# Patient Record
Sex: FEMALE | Race: WHITE | URBAN - METROPOLITAN AREA
[De-identification: names, ages, dates, MRNs, and addresses within clinical notes are randomized per-mention and may not be internally consistent; named-entity substitution may affect disease eponyms.]

---

## 2017-01-17 ENCOUNTER — HOSPITAL ENCOUNTER (OUTPATIENT)
Dept: LAB | Age: 27
Discharge: HOME | End: 2017-01-17
Payer: SELF-PAY

## 2017-01-17 ENCOUNTER — OFFICE VISIT (OUTPATIENT)
Dept: INTERNAL MEDICINE CLINIC | Age: 27
End: 2017-01-17

## 2017-01-17 VITALS
OXYGEN SATURATION: 100 % | HEIGHT: 66 IN | HEART RATE: 109 BPM | SYSTOLIC BLOOD PRESSURE: 111 MMHG | WEIGHT: 132 LBS | BODY MASS INDEX: 21.21 KG/M2 | TEMPERATURE: 97.2 F | RESPIRATION RATE: 14 BRPM | DIASTOLIC BLOOD PRESSURE: 77 MMHG

## 2017-01-17 DIAGNOSIS — Z00.00 ROUTINE GENERAL MEDICAL EXAMINATION AT A HEALTH CARE FACILITY: Primary | ICD-10-CM

## 2017-01-17 DIAGNOSIS — Z86.19 HISTORY OF VARICELLA: ICD-10-CM

## 2017-01-17 DIAGNOSIS — Z11.1 ENCOUNTER FOR PPD TEST: ICD-10-CM

## 2017-01-17 LAB
MM INDURATION POC: NORMAL MM (ref 0–5)
PPD POC: NORMAL NEGATIVE

## 2017-01-17 PROCEDURE — 36415 COLL VENOUS BLD VENIPUNCTURE: CPT | Performed by: INTERNAL MEDICINE

## 2017-01-17 PROCEDURE — 86787 VARICELLA-ZOSTER ANTIBODY: CPT | Performed by: INTERNAL MEDICINE

## 2017-01-17 RX ORDER — DEXTROAMPHETAMINE SACCHARATE, AMPHETAMINE ASPARTATE, DEXTROAMPHETAMINE SULFATE AND AMPHETAMINE SULFATE 5; 5; 5; 5 MG/1; MG/1; MG/1; MG/1
TABLET ORAL
Refills: 0 | COMMUNITY
Start: 2016-12-31

## 2017-01-17 NOTE — PROGRESS NOTES
ROOM # 1    Kevin Dao presents today for   Chief Complaint   Patient presents with    Complete Physical       Kevin Dao preferred language for health care discussion is english/other. Is someone accompanying this pt? no    Is the patient using any DME equipment during OV? no    Depression Screening:  No flowsheet data found. Learning Assessment:  Learning Assessment 1/17/2017   PRIMARY LEARNER Patient   HIGHEST LEVEL OF EDUCATION - PRIMARY LEARNER  SOME COLLEGE   BARRIERS PRIMARY LEARNER NONE   CO-LEARNER CAREGIVER No   PRIMARY LANGUAGE ENGLISH   LEARNER PREFERENCE PRIMARY LISTENING   ANSWERED BY Patient   RELATIONSHIP SELF       Abuse Screening:  No flowsheet data found. Fall Risk  No flowsheet data found. Health Maintenance reviewed and discussed per provider. Yes    Kevin Dao is due for no. Please order/place referral if appropriate. Advance Directive:  1. Do you have an advance directive in place? Patient Reply: no    2. If not, would you like material regarding how to put one in place? Patient Reply: no    Coordination of Care:  1. Have you been to the ER, urgent care clinic since your last visit? Hospitalized since your last visit? no    2. Have you seen or consulted any other health care providers outside of the [address] [place] [address] since your last visit? Include any pap smears or colon screening.  no      Pt requesting 2 stage ppd

## 2017-01-17 NOTE — MR AVS SNAPSHOT
Visit Information Date & Time Provider Department Dept. Phone Encounter #  
 1/17/2017  8:00 AM Bryanna Ellis MD [address] [phone] [id number] Follow-up Instructions Return if symptoms worsen or fail to improve. Upcoming Health Maintenance Date Due  
 PAP AKA CERVICAL CYTOLOGY 7/31/2011 DTaP/Tdap/Td series (2 - Td) 5/18/2019 Allergies as of 1/17/2017  Review Complete On: 1/17/2017 By: Bryanna Ellis MD  
 No Known Allergies Current Immunizations  Never Reviewed Name Date  
 TB Skin Test (PPD) Intradermal  Incomplete Not reviewed this visit You Were Diagnosed With   
  
 Codes Comments Routine general medical examination at a health care facility    -  Primary ICD-10-CM: Z00.00 ICD-9-CM: V70.0 Encounter for PPD test     ICD-10-CM: Z11.1 ICD-9-CM: V74.1 History of varicella     ICD-10-CM: Z86.19 ICD-9-CM: V12.09 Vitals BP Pulse Temp Resp Height(growth percentile) Weight(growth percentile) 111/77 (!) 109 97.2 °F (36.2 °C) (Oral) 14 5' 6\" (1.676 m) 132 lb (59.9 kg) LMP SpO2 BMI OB Status Smoking Status 12/26/2016 (Approximate) 100% 21.31 kg/m2 Having regular periods Current Some Day Smoker BMI and BSA Data Body Mass Index Body Surface Area  
 21.31 kg/m 2 1.67 m 2 Preferred Pharmacy Pharmacy Name Phone [facility] [address] [phone] Your Updated Medication List  
  
   
This list is accurate as of: 1/17/17  8:35 AM.  Always use your most recent med list.  
  
  
  
  
 dextroamphetamine-amphetamine 20 mg tablet Commonly known as:  ADDERALL TK 1 T PO TID We Performed the Following AMB POC TUBERCULOSIS, INTRADERMAL (SKIN TEST) [25065 CPT(R)] Follow-up Instructions Return if symptoms worsen or fail to improve. To-Do List   
 01/17/2017   Lab:  VZV AB, IGG   
  
  
 Patient Instructions 1) follow-up as needed or sooner if worsening symptoms. Introducing [facility]! [facility] introduces [facility] patient portal. Now you can access parts of your medical record, email your doctor's office, and request medication refills online. 1. In your internet browser, go to [email or web address] 2. Click on the First Time User? Click Here link in the Sign In box. You will see the New Member Sign Up page. 3. Enter your [facility] Access Code exactly as it appears below. You will not need to use this code after youve completed the sign-up process. If you do not sign up before the expiration date, you must request a new code. · [facility] Access Code: [email or web address] Expires: 4/17/2017  8:29 AM 
 
4. Enter the last four digits of your Social Security Number (xxxx) and Date of Birth (mm/dd/yyyy) as indicated and click Submit. You will be taken to the next sign-up page. 5. Create a [facility] ID. This will be your [facility] login ID and cannot be changed, so think of one that is secure and easy to remember. 6. Create a [facility] password. You can change your password at any time. 7. Enter your Password Reset Question and Answer. This can be used at a later time if you forget your password. 8. Enter your e-mail address. You will receive e-mail notification when new information is available in [address] 9. Click Sign Up. You can now view and download portions of your medical record. 10. Click the Download Summary menu link to download a portable copy of your medical information. If you have questions, please visit the Frequently Asked Questions section of the [facility] website. Remember, [facility] is NOT to be used for urgent needs. For medical emergencies, dial 911. Now available from your iPhone and Android! Please provide this summary of care documentation to your next provider. If you have any questions after today's visit, please call [phone].

## 2017-01-17 NOTE — PROGRESS NOTES
Chief Complaint   Patient presents with    Complete Physical         HPI:     Evelina Guerra is a 32 y.o.  female with history of  ADHD  who presents for complete physical exam. 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 Past Medical History   Diagnosis Date    ADHD (attention deficit hyperactivity disorder)      Being followed by Dr. Jo-Ann Bolden       History reviewed. No pertinent past surgical history. MEDICATION ALLERGIES/INTOLERANCES:   No Known Allergies          CURRENT MEDICATIONS:    Current Outpatient Prescriptions   Medication Sig    dextroamphetamine-amphetamine (ADDERALL) 20 mg tablet TK 1 T PO TID     No current facility-administered medications for this visit. Health Maintenance   Topic Date Due    PAP AKA CERVICAL CYTOLOGY  07/31/2011    DTaP/Tdap/Td series (2 - Td) 05/18/2019    HPV AGE 9Y-34Y  Completed    INFLUENZA AGE 9 TO ADULT  Addressed         FAMILY HISTORY:   Family History   Problem Relation Age of Onset    No Known Problems Mother     No Known Problems Father        SOCIAL HISTORY:   She  reports that she has been smoking. She has smoked for the past 0.50 years.  She uses smokeless tobacco.  She  reports that she drinks about 1.2 oz of alcohol per week       [email or web address]:  Pap smearL 11/2016  Hep B: 2009  TDAP: 5/18/2009  Flu shot: declines    ROS:   General: negative for - chills, fatigue, fever, weight loss or weight gain, night sweats  HEENT: negative for - no sore throat, nasal congestion,  or ear problems, positive for myopic--wears glasses  Resp: negative for - cough, shortness of breath or wheezing  CV: negative for - chest pain, palpitations, orthopnea or PND,  GI: negative for - abdominal pain, change in bowel habits, constipation, diarrhea, blood or black tarry stools, or nausea/vomiting  : negative for - dysuria, hematuria, incontinence, pelvic pain or vulvar/vaginal symptoms  Heme: negative for -excessive bleeding or bruising  Endo: negative for - hot flashes, polydipsia/polyuria or hot or cold intolerance  MSK: negative for - joint pain, joint swelling or muscle pain  Neuro: negative for - numbness, tingling, headache or dizziness  Derm: negative for - dry skin, hair changes, rash or skin lesion changes  Psych: negative for - anxiety, depression, irritability or mood swings or insomnia    OBJECTIVE:  PHYSICAL EXAM: Vitals:   Vitals:    01/17/17 0813   BP: 111/77   Pulse: (!) 109   Resp: 14   Temp: 97.2 °F (36.2 °C)   TempSrc: Oral   SpO2: 100%   Weight: 132 lb (59.9 kg)   Height: 5' 6\" (1.676 m)     Generally: Pleasant female in no acute distress    HEENT exam: Head: atraumatic     Eyes: Pupils equally round and reactive to light, Fundoscopic exam is                       normal               Ears: bilaterally: Normal tympanic membrane, no erythema or exudate,                         normal light Reflex    Nares: moist mucosa, no erythema               Mouth: clear, no erythema or exudate     Neck: supple, no lymphadenopathy, negative thyromegaly, negative                                   carotid bruits bilaterally    Cardiac exam: regular, rate, and rhythm. No murmurs, gallops, or rubs. Normal S1 and S2.    Pulmonary exam: Clear to ausculation bilaterally    Abdominal exam: Positive bowel sounds in all four quadrants, soft, nondistended, nontender. No hepatosplenomegaly. Extremities: 2+ dorsalis pedis bilaterally. No pedal edema bilaterally. Musculoskeletal exam: 5 out of 5 strength in upper and lower extremities bilaterally. Good range of motion in upper and lower extremities. 2 out of 2 reflexes in upper and lower extremities bilaterally. Neurological exam: Cranial nerves II-XII all intact. Normal sensation in upper and lower extremities. Normal gait. Skin: various freckles on chest and back that seem okay           LABS/RADIOLOGICAL TESTS:   none      ASSESSMENT/PLAN:      ICD-10-CM ICD-9-CM    1. Routine general medical examination at a health care facility Z00.00 V70.0    2. Encounter for PPD test Z11.1 V74.1 AMB POC TUBERCULOSIS, INTRADERMAL (SKIN TEST)   3. History of varicella Z86.19 V12.09 VZV AB, IGG   4. Patient verbalized understanding and agreement with the plan. 5. Patient was given after visit summary. 6. Follow-up Disposition:  Return if symptoms worsen or fail to improve. or sooner if worsening symptoms.         Bard Mikey MD

## 2017-01-18 LAB — VZV IGG SER IA-ACNC: 2172 INDEX

## 2017-01-31 ENCOUNTER — CLINICAL SUPPORT (OUTPATIENT)
Dept: INTERNAL MEDICINE CLINIC | Age: 27
End: 2017-01-31

## 2017-01-31 DIAGNOSIS — Z11.1 SCREENING FOR TUBERCULOSIS: Primary | ICD-10-CM

## 2017-01-31 LAB
MM INDURATION POC: 0 MM (ref 0–5)
PPD POC: NEGATIVE NEGATIVE